# Patient Record
Sex: MALE | Race: BLACK OR AFRICAN AMERICAN | Employment: UNEMPLOYED | URBAN - METROPOLITAN AREA
[De-identification: names, ages, dates, MRNs, and addresses within clinical notes are randomized per-mention and may not be internally consistent; named-entity substitution may affect disease eponyms.]

---

## 2021-03-03 ENCOUNTER — HOSPITAL ENCOUNTER (OUTPATIENT)
Dept: LAB | Age: 46
Discharge: HOME | End: 2021-03-03
Payer: COMMERCIAL

## 2021-03-03 LAB
ALBUMIN SERPL-MCNC: 4.4 G/DL (ref 3.4–5)
ALBUMIN/GLOB SERPL: 1.3 {RATIO} (ref 0.8–1.7)
ALP SERPL-CCNC: 81 U/L (ref 45–117)
ALT SERPL-CCNC: 25 U/L (ref 16–61)
AST SERPL-CCNC: 18 U/L (ref 10–38)
BILIRUB DIRECT SERPL-MCNC: 0.1 MG/DL (ref 0–0.2)
BILIRUB SERPL-MCNC: 0.6 MG/DL (ref 0.2–1)
ERYTHROCYTE [DISTWIDTH] IN BLOOD BY AUTOMATED COUNT: 13 % (ref 11.6–14.5)
GLOBULIN SER CALC-MCNC: 3.3 G/DL (ref 2–4)
HCT VFR BLD AUTO: 48.7 % (ref 36–48)
HGB BLD-MCNC: 16.6 G/DL (ref 13–16)
MCH RBC QN AUTO: 29.4 PG (ref 24–34)
MCHC RBC AUTO-ENTMCNC: 34.1 G/DL (ref 31–37)
MCV RBC AUTO: 86.3 FL (ref 74–97)
PLATELET # BLD AUTO: 178 K/UL (ref 135–420)
PMV BLD AUTO: 10.6 FL (ref 9.2–11.8)
PROT SERPL-MCNC: 7.7 G/DL (ref 6.4–8.2)
RBC # BLD AUTO: 5.64 M/UL (ref 4.7–5.5)
WBC # BLD AUTO: 4.6 K/UL (ref 4.6–13.2)

## 2021-03-03 PROCEDURE — 36415 COLL VENOUS BLD VENIPUNCTURE: CPT

## 2021-03-03 PROCEDURE — 85027 COMPLETE CBC AUTOMATED: CPT

## 2021-03-03 PROCEDURE — 80076 HEPATIC FUNCTION PANEL: CPT

## 2023-09-19 ENCOUNTER — HOSPITAL ENCOUNTER (EMERGENCY)
Facility: HOSPITAL | Age: 48
Discharge: HOME | End: 2023-09-19
Payer: COMMERCIAL

## 2023-09-19 ENCOUNTER — APPOINTMENT (OUTPATIENT)
Facility: HOSPITAL | Age: 48
End: 2023-09-19
Payer: COMMERCIAL

## 2023-09-19 VITALS
SYSTOLIC BLOOD PRESSURE: 189 MMHG | RESPIRATION RATE: 16 BRPM | TEMPERATURE: 98.1 F | HEART RATE: 82 BPM | BODY MASS INDEX: 28.35 KG/M2 | DIASTOLIC BLOOD PRESSURE: 116 MMHG | WEIGHT: 191.4 LBS | OXYGEN SATURATION: 99 % | HEIGHT: 69 IN

## 2023-09-19 DIAGNOSIS — S13.9XXA CERVICAL SPRAIN, INITIAL ENCOUNTER: Primary | ICD-10-CM

## 2023-09-19 DIAGNOSIS — S46.911A STRAIN OF RIGHT SHOULDER, INITIAL ENCOUNTER: ICD-10-CM

## 2023-09-19 PROCEDURE — 96372 THER/PROPH/DIAG INJ SC/IM: CPT

## 2023-09-19 PROCEDURE — 73030 X-RAY EXAM OF SHOULDER: CPT

## 2023-09-19 PROCEDURE — 6370000000 HC RX 637 (ALT 250 FOR IP)

## 2023-09-19 PROCEDURE — 99284 EMERGENCY DEPT VISIT MOD MDM: CPT

## 2023-09-19 PROCEDURE — 6360000002 HC RX W HCPCS

## 2023-09-19 PROCEDURE — 72040 X-RAY EXAM NECK SPINE 2-3 VW: CPT

## 2023-09-19 RX ORDER — METHOCARBAMOL 500 MG/1
500 TABLET, FILM COATED ORAL 3 TIMES DAILY
Qty: 15 TABLET | Refills: 0 | Status: SHIPPED | OUTPATIENT
Start: 2023-09-19 | End: 2023-09-24

## 2023-09-19 RX ORDER — LIDOCAINE 4 G/G
1 PATCH TOPICAL ONCE
Status: DISCONTINUED | OUTPATIENT
Start: 2023-09-19 | End: 2023-09-19 | Stop reason: HOSPADM

## 2023-09-19 RX ORDER — NAPROXEN 500 MG/1
500 TABLET ORAL 2 TIMES DAILY
Qty: 20 TABLET | Refills: 0 | Status: SHIPPED | OUTPATIENT
Start: 2023-09-19 | End: 2023-09-29

## 2023-09-19 RX ORDER — LIDOCAINE 50 MG/G
1 PATCH TOPICAL DAILY
Qty: 10 PATCH | Refills: 0 | Status: SHIPPED | OUTPATIENT
Start: 2023-09-19 | End: 2023-09-29

## 2023-09-19 RX ORDER — KETOROLAC TROMETHAMINE 15 MG/ML
15 INJECTION, SOLUTION INTRAMUSCULAR; INTRAVENOUS ONCE
Status: DISCONTINUED | OUTPATIENT
Start: 2023-09-19 | End: 2023-09-19

## 2023-09-19 RX ORDER — AMLODIPINE BESYLATE 5 MG/1
5 TABLET ORAL DAILY
Qty: 30 TABLET | Refills: 0 | Status: SHIPPED | OUTPATIENT
Start: 2023-09-19 | End: 2023-10-19

## 2023-09-19 RX ORDER — METHOCARBAMOL 500 MG/1
500 TABLET, FILM COATED ORAL
Status: COMPLETED | OUTPATIENT
Start: 2023-09-19 | End: 2023-09-19

## 2023-09-19 RX ORDER — KETOROLAC TROMETHAMINE 15 MG/ML
15 INJECTION, SOLUTION INTRAMUSCULAR; INTRAVENOUS ONCE
Status: COMPLETED | OUTPATIENT
Start: 2023-09-19 | End: 2023-09-19

## 2023-09-19 RX ADMIN — KETOROLAC TROMETHAMINE 15 MG: 15 INJECTION, SOLUTION INTRAMUSCULAR; INTRAVENOUS at 11:10

## 2023-09-19 RX ADMIN — METHOCARBAMOL 500 MG: 500 TABLET ORAL at 11:10

## 2023-09-19 ASSESSMENT — PAIN DESCRIPTION - LOCATION: LOCATION: BACK;ARM;SHOULDER

## 2023-09-19 ASSESSMENT — LIFESTYLE VARIABLES
HOW OFTEN DO YOU HAVE A DRINK CONTAINING ALCOHOL: NEVER
HOW MANY STANDARD DRINKS CONTAINING ALCOHOL DO YOU HAVE ON A TYPICAL DAY: PATIENT DOES NOT DRINK

## 2023-09-19 ASSESSMENT — ENCOUNTER SYMPTOMS
CHEST TIGHTNESS: 0
SHORTNESS OF BREATH: 0
ABDOMINAL PAIN: 0
VOMITING: 0
COUGH: 0
DIARRHEA: 0
CONSTIPATION: 0
NAUSEA: 0

## 2023-09-19 ASSESSMENT — PAIN DESCRIPTION - FREQUENCY: FREQUENCY: CONTINUOUS

## 2023-09-19 ASSESSMENT — PAIN DESCRIPTION - ORIENTATION: ORIENTATION: RIGHT

## 2023-09-19 ASSESSMENT — PAIN SCALES - GENERAL
PAINLEVEL_OUTOF10: 8
PAINLEVEL_OUTOF10: 8

## 2023-09-19 ASSESSMENT — PAIN DESCRIPTION - PAIN TYPE: TYPE: ACUTE PAIN

## 2023-09-19 ASSESSMENT — PAIN - FUNCTIONAL ASSESSMENT: PAIN_FUNCTIONAL_ASSESSMENT: 0-10

## 2023-09-19 ASSESSMENT — PAIN DESCRIPTION - DESCRIPTORS: DESCRIPTORS: ACHING

## 2023-09-19 NOTE — ED PROVIDER NOTES
EMERGENCY DEPARTMENT HISTORY AND PHYSICAL EXAM    7:43 PM      Date: 9/19/2023  Patient Name: Sana Shah    History of Presenting Illness     Chief Complaint   Patient presents with    Shoulder Pain    Shoulder Injury    Back Pain     Arm     Arm Pain         History Provided By: the patient. Additional History (Context): Sana Shah is a 57 y.o. male with No past medical history on file. who presents with right-sided neck and shoulder pain x2 weeks. Patient reports that he works out daily and he thinks that symptoms may be due to a workout injury. Reports right-sided neck pain as well as pain going into his right shoulder and down the arm. Denies any numbness or tingling. Denies any weakness in the arm. Denies any known trauma or recent falls. States that he has been taking Tylenol and ibuprofen. Reports that the ibuprofen tends to help more than the Tylenol. States that he is still been working out. Patient reports a medical history of hypertension, but states that he has not been taking any of his medications for the last year. States that he does not have the medication or a primary care doctor. Denies any chest pain or shortness of breath. Patient states that he does have a history of a prior right-sided rotator cuff injury a few years ago that he just let heal on its own. PCP: No primary care provider on file. No current facility-administered medications for this encounter. Current Outpatient Medications   Medication Sig Dispense Refill    methocarbamol (ROBAXIN) 500 MG tablet Take 1 tablet by mouth 3 times daily for 5 days 15 tablet 0    naproxen (NAPROSYN) 500 MG tablet Take 1 tablet by mouth 2 times daily for 10 days 20 tablet 0    lidocaine (LIDODERM) 5 % Place 1 patch onto the skin daily for 10 days 12 hours on, 12 hours off.  10 patch 0    amLODIPine (NORVASC) 5 MG tablet Take 1 tablet by mouth daily 30 tablet 0       Past History     Past Medical History:  No past

## 2023-09-19 NOTE — ED TRIAGE NOTES
Patient c/o neck, back and shoulder pains on the right side for two weeks. States that he thinks that this is a workout injury from two weeks ago.

## 2023-09-19 NOTE — ED NOTES
Discharge instructions reviewed with patient. Patient verbalized understanding. Patient advised to follow up as directed on discharge instructions. Patient denies questions, needs or concerns at this time. Patient verbalized understanding. No s/sx of distress noted.        Miller Guerra RN  09/19/23 [id number]

## 2023-09-19 NOTE — DISCHARGE INSTRUCTIONS
Begin taking Robaxin up to 3 times a day as needed for muscle spasm. Begin taking Naproxen up to 2 times a day as needed for pain. Make sure you take this medication with food. Place Lidoderm patch on the affected area 12 hours on 12 hours off. Follow-up with orthopedic spine Dr. Hiwot Mac within the next 2 days in order to be reevaluated. Regarding your blood pressure, begin taking Norvasc once a day for 30 days. Please begin monitoring your blood pressure, taking it 1-2 times a day. Follow-up with [facility] medicine to establish care with a PCP. Return to the ED with any new or worsening symptoms.

## 2023-10-06 ENCOUNTER — HOSPITAL ENCOUNTER (OUTPATIENT)
Facility: HOSPITAL | Age: 48
Setting detail: RECURRING SERIES
Discharge: HOME | End: 2023-10-09
Payer: COMMERCIAL

## 2023-10-06 PROCEDURE — 97162 PT EVAL MOD COMPLEX 30 MIN: CPT

## 2023-10-06 NOTE — PROGRESS NOTES
[address] PHYSICAL THERAPY AT [place] [address] [place], [address]  Phone: [phone] Fax [phone]  Plan of Care / Statement of Necessity for Physical Therapy Services     Patient Name: Isauro Bowen : 1975   Treatment   Diagnosis: M79.601  RIGHT ARM PAIN and M25.511  RIGHT SHOULDER PAIN and M54.2  NECK PAIN Medical Diagnosis: Pain in right shoulder [M25.511]  Neck pain [M54.2]   Onset Date: 23 - referral date Payor Source: Payor: Maurizio / Plan: [place] / Product Type: *No Product type* /    Referral Source: Raphael Sainz* Start of Care [facility] 10/6/2023   Prior Hospitalization: See medical history Provider #: [id number]   Prior Level of Function: PLOF: Right handed, Previously independent with all ADLs, able to perform gym going activity without difficulty, Living Situation: Lives with wife in a 1 story home   Comorbidities: HTN     Assessment / key information:  Pt is a 57 y.o. male who presents with c/o neck and right shoulder pain. Symptoms occurred with acute onset one morning upon waking following a light day at the gym the day before. Reported functional deficits include: difficulty reaching overhead and across his body, increased pain at night - sleep disturbances, typically sleeps on right hand, increased difficulty with dressing, increased difficulty with driving, increased difficulty carrying heavier objects, increased difficulty with moderate-heavy household chores/yard work. Upon exam, pt exhibited decreased cervical ROM and right shoulder AROM, decreased cervical flexor endurance, decreased right UE strength throughout, positive provocative tests indicating cervical radiculopathy and RTC pathology, and TTP at cervical paraspinals, medial scapular border bilaterally, right clavicle, coracoid process, teres mm. . Patient scored 47 on FOTO indicating moderately decreased function
abnormalities, and instruct in home and community integration to address functional deficits and attain remaining goals.      [x] SEE POC for goals and assessment     PLAN  []  Upgrade activities as tolerated     [x]  Continue plan of care  []  Update interventions per flow sheet       []  Other:_      Isacc Garces, PT 10/6/2023  11:38 AM

## 2023-10-18 ENCOUNTER — HOSPITAL ENCOUNTER (OUTPATIENT)
Facility: HOSPITAL | Age: 48
Setting detail: RECURRING SERIES
Discharge: HOME | End: 2023-10-21
Payer: COMMERCIAL

## 2023-10-18 PROCEDURE — 97112 NEUROMUSCULAR REEDUCATION: CPT

## 2023-10-18 PROCEDURE — 97110 THERAPEUTIC EXERCISES: CPT

## 2023-10-18 PROCEDURE — 97530 THERAPEUTIC ACTIVITIES: CPT

## 2023-10-18 NOTE — PROGRESS NOTES
Total Total     [x]  Patient Education billed concurrently with other procedures   [x] Review HEP    [] Progressed/Changed HEP, detail:    [] Other detail:       Objective Information/Functional Measures/Assessment    Patient seen today to address Pain in right shoulder [M25.511], Neck pain [M54.2]. Skilled instruction provided with new ex's for correct form, and to promote proper scapular mechanics. Challenged wi th TB ER and with prone horizontal abduction due to pain along anterior aspect of shoulder. Declined modalites at end os session    Patient will continue to benefit from skilled PT / OT services to modify and progress therapeutic interventions, analyze and address functional mobility deficits, analyze and address ROM deficits, analyze and address strength deficits, analyze and address soft tissue restrictions, analyze and cue for proper movement patterns, analyze and modify for postural abnormalities, analyze and address imbalance/dizziness, and instruct in home and community integration to address functional deficits and attain remaining goals. Progress toward goals / Updated goals:  []  See Progress Note/Recertification    1- Goal: Pt to be compliant with initial HEP to improve performance of ADLs. Status at last note/certification: Established and reviewed with pt  2- Goal: Pt to improve cervical AROM by >10 degrees in all directions without pain to improve ability to drive safely. Status at last note/certification:                   AROM (deg)  Right Left   Flexion 52 p! Extension 58   Side Bend 33 p! 49   Rotation 48 58         Long Term Goals: To be accomplished in 16 treatments:  1- Goal: Pt to improve right functional IR/ER to match the left to improve ability to perform dressing/grooming ADLs.   Status at last note/certification: Functional ER: Right C7, Left T4; Functional IR: Right L5, Left T8  2- Goal: Pt to improve right shoulder flexion/abduction AROM to match the left to improve

## 2023-10-24 ENCOUNTER — HOSPITAL ENCOUNTER (OUTPATIENT)
Facility: HOSPITAL | Age: 48
Setting detail: RECURRING SERIES
Discharge: HOME | End: 2023-10-27
Payer: COMMERCIAL

## 2023-10-24 PROCEDURE — 97112 NEUROMUSCULAR REEDUCATION: CPT

## 2023-10-24 PROCEDURE — 97110 THERAPEUTIC EXERCISES: CPT

## 2023-10-24 PROCEDURE — 97530 THERAPEUTIC ACTIVITIES: CPT

## 2023-10-24 NOTE — PROGRESS NOTES
PHYSICAL / OCCUPATIONAL THERAPY - DAILY TREATMENT NOTE (updated )    Patient Name: Brendan Bowie    Date: 10/24/2023    : 1975  Insurance: Payor: Maurizio / Plan: Maurizio / Product Type: *No Product type* /      Patient  verified Yes     Visit #   Current / Total 3 16   Time   In / Out 5:24 6:02   Pain   In / Out 0 0   Subjective Functional Status/Changes: Pt reports no significant changes      TREATMENT AREA =  Pain in right shoulder [M25.511]  Neck pain [M54.2]    OBJECTIVE    Therapeutic Procedures: Tx Min Billable or 1:1 Min (if diff from Tx Min) Procedure, Rationale, Specifics   11 11 75949 Therapeutic Exercise (timed):  increase ROM, strength, coordination, balance, and proprioception to improve patient's ability to progress to PLOF and address remaining functional goals. (see flow sheet as applicable)     Details if applicable:       18 18 17100 Neuromuscular Re-Education (timed):  improve balance, coordination, kinesthetic sense, posture, core stability and proprioception to improve patient's ability to develop conscious control of individual muscles and awareness of position of extremities in order to progress to PLOF and address remaining functional goals. (see flow sheet as applicable)     Details if applicable:     9 9 03167 Therapeutic Activity (timed):  use of dynamic activities replicating functional movements to increase ROM, strength, coordination, balance, and proprioception in order to improve patient's ability to progress to PLOF and address remaining functional goals.   (see flow sheet as applicable)     Details if applicable:     45 38 [address] [facility] Reminder: bill using total billable min of TIMED therapeutic procedures (example: do not include dry needle or estim unattended, both untimed codes, in totals to left)  8-22 min = 1 unit; 23-37 min = 2 units; 38-52 min = 3 units; 53-67 min = 4 units; 68-82 min = 5 units   Total Total     [x]

## 2023-11-01 ENCOUNTER — HOSPITAL ENCOUNTER (OUTPATIENT)
Facility: HOSPITAL | Age: 48
Setting detail: RECURRING SERIES
Discharge: HOME | End: 2023-11-04
Payer: COMMERCIAL

## 2023-11-01 PROCEDURE — 97112 NEUROMUSCULAR REEDUCATION: CPT

## 2023-11-01 PROCEDURE — 97110 THERAPEUTIC EXERCISES: CPT

## 2023-11-01 NOTE — PROGRESS NOTES
PHYSICAL / OCCUPATIONAL THERAPY - DAILY TREATMENT NOTE (updated )    Patient Name: Caterina Medrano    Date: 2023    : 1975  Insurance: Payor: Maurizio / Plan: Maurizio / Product Type: *No Product type* /      Patient  verified Yes     Visit #   Current / Total 4 16   Time   In / Out 5:37 6:00   Pain   In / Out 0 0   Subjective Functional Status/Changes: I feel better now. TREATMENT AREA =  Pain in right shoulder [M25.511]  Neck pain [M54.2]    OBJECTIVE      Therapeutic Procedures: Tx Min Billable or 1:1 Min (if diff from Tx Min) Procedure, Rationale, Specifics   13  33802 Therapeutic Exercise (timed):  increase ROM, strength, coordination, balance, and proprioception to improve patient's ability to progress to PLOF and address remaining functional goals. (see flow sheet as applicable)     Details if applicable:       10  19359 Neuromuscular Re-Education (timed):  improve balance, coordination, kinesthetic sense, posture, core stability and proprioception to improve patient's ability to develop conscious control of individual muscles and awareness of position of extremities in order to progress to PLOF and address remaining functional goals.  (see flow sheet as applicable)     Details if applicable:            Details if applicable:            Details if applicable:            Details if applicable:     21  [facility] Totals Reminder: bill using total billable min of TIMED therapeutic procedures (example: do not include dry needle or estim unattended, both untimed codes, in totals to left)  8-22 min = 1 unit; 23-37 min = 2 units; 38-52 min = 3 units; 53-67 min = 4 units; 68-82 min = 5 units   Total Total     [x]  Patient Education billed concurrently with other procedures   [x] Review HEP    [] Progressed/Changed HEP, detail:    [] Other detail:       Objective Information/Functional Measures/Assessment    Patient arrived 15 minutes late for appointment,

## 2023-11-07 ENCOUNTER — APPOINTMENT (OUTPATIENT)
Facility: HOSPITAL | Age: 48
End: 2023-11-07
Payer: COMMERCIAL

## 2023-11-15 ENCOUNTER — HOSPITAL ENCOUNTER (OUTPATIENT)
Facility: HOSPITAL | Age: 48
Setting detail: RECURRING SERIES
Discharge: HOME | End: 2023-11-18
Payer: COMMERCIAL

## 2023-11-15 PROCEDURE — 97110 THERAPEUTIC EXERCISES: CPT

## 2023-11-15 PROCEDURE — 97530 THERAPEUTIC ACTIVITIES: CPT

## 2023-11-20 ENCOUNTER — APPOINTMENT (OUTPATIENT)
Facility: HOSPITAL | Age: 48
End: 2023-11-20
Payer: COMMERCIAL

## 2023-11-29 ENCOUNTER — APPOINTMENT (OUTPATIENT)
Facility: HOSPITAL | Age: 48
End: 2023-11-29
Payer: COMMERCIAL

## 2024-12-21 ENCOUNTER — APPOINTMENT (OUTPATIENT)
Facility: HOSPITAL | Age: 49
End: 2024-12-21
Payer: COMMERCIAL

## 2024-12-21 ENCOUNTER — HOSPITAL ENCOUNTER (EMERGENCY)
Facility: HOSPITAL | Age: 49
Discharge: HOME | End: 2024-12-21
Attending: EMERGENCY MEDICINE
Payer: COMMERCIAL

## 2024-12-21 VITALS
OXYGEN SATURATION: 94 % | TEMPERATURE: 99.1 F | DIASTOLIC BLOOD PRESSURE: 91 MMHG | SYSTOLIC BLOOD PRESSURE: 148 MMHG | HEIGHT: 69 IN | HEART RATE: 68 BPM | RESPIRATION RATE: 16 BRPM | WEIGHT: 185.2 LBS | BODY MASS INDEX: 27.43 KG/M2

## 2024-12-21 DIAGNOSIS — J11.1 FLU: ICD-10-CM

## 2024-12-21 DIAGNOSIS — K85.00 IDIOPATHIC ACUTE PANCREATITIS WITHOUT INFECTION OR NECROSIS: ICD-10-CM

## 2024-12-21 DIAGNOSIS — J18.9 PNEUMONIA OF BOTH LOWER LOBES DUE TO INFECTIOUS ORGANISM: Primary | ICD-10-CM

## 2024-12-21 LAB
ALBUMIN SERPL-MCNC: 3.6 G/DL (ref 3.4–5)
ALBUMIN/GLOB SERPL: 0.9 (ref 0.8–1.7)
ALP SERPL-CCNC: 71 U/L (ref 45–117)
ALT SERPL-CCNC: 28 U/L (ref 16–61)
ANION GAP SERPL CALC-SCNC: 9 MMOL/L (ref 3–18)
AST SERPL-CCNC: 41 U/L (ref 10–38)
BASOPHILS # BLD: 0 K/UL (ref 0–0.1)
BASOPHILS NFR BLD: 0 % (ref 0–2)
BILIRUB SERPL-MCNC: 0.5 MG/DL (ref 0.2–1)
BUN SERPL-MCNC: 14 MG/DL (ref 7–18)
BUN/CREAT SERPL: 10 (ref 12–20)
CALCIUM SERPL-MCNC: 8.7 MG/DL (ref 8.5–10.1)
CHLORIDE SERPL-SCNC: 98 MMOL/L (ref 100–111)
CO2 SERPL-SCNC: 29 MMOL/L (ref 21–32)
CREAT SERPL-MCNC: 1.38 MG/DL (ref 0.6–1.3)
DIFFERENTIAL METHOD BLD: ABNORMAL
EKG ATRIAL RATE: 87 BPM
EKG DIAGNOSIS: NORMAL
EKG P AXIS: 51 DEGREES
EKG P-R INTERVAL: 156 MS
EKG Q-T INTERVAL: 350 MS
EKG QRS DURATION: 82 MS
EKG QTC CALCULATION (BAZETT): 421 MS
EKG R AXIS: -16 DEGREES
EKG T AXIS: 35 DEGREES
EKG VENTRICULAR RATE: 87 BPM
EOSINOPHIL # BLD: 0 K/UL (ref 0–0.4)
EOSINOPHIL NFR BLD: 0 % (ref 0–5)
ERYTHROCYTE [DISTWIDTH] IN BLOOD BY AUTOMATED COUNT: 13 % (ref 11.6–14.5)
GLOBULIN SER CALC-MCNC: 3.8 G/DL (ref 2–4)
GLUCOSE SERPL-MCNC: 103 MG/DL (ref 74–99)
HCT VFR BLD AUTO: 51.2 % (ref 36–48)
HGB BLD-MCNC: 17.5 G/DL (ref 13–16)
IMM GRANULOCYTES # BLD AUTO: 0 K/UL (ref 0–0.04)
IMM GRANULOCYTES NFR BLD AUTO: 0 % (ref 0–0.5)
LIPASE SERPL-CCNC: 199 U/L (ref 13–75)
LYMPHOCYTES # BLD: 0.8 K/UL (ref 0.9–3.6)
LYMPHOCYTES NFR BLD: 40 % (ref 21–52)
MCH RBC QN AUTO: 29 PG (ref 24–34)
MCHC RBC AUTO-ENTMCNC: 34.2 G/DL (ref 31–37)
MCV RBC AUTO: 84.9 FL (ref 78–100)
MONOCYTES # BLD: 0.3 K/UL (ref 0.05–1.2)
MONOCYTES NFR BLD: 12 % (ref 3–10)
NEUTS SEG # BLD: 1 K/UL (ref 1.8–8)
NEUTS SEG NFR BLD: 48 % (ref 40–73)
NRBC # BLD: 0 K/UL (ref 0–0.01)
NRBC BLD-RTO: 0 PER 100 WBC
PLATELET # BLD AUTO: 124 K/UL (ref 135–420)
PMV BLD AUTO: 9.7 FL (ref 9.2–11.8)
POTASSIUM SERPL-SCNC: 3.6 MMOL/L (ref 3.5–5.5)
PROT SERPL-MCNC: 7.4 G/DL (ref 6.4–8.2)
RBC # BLD AUTO: 6.03 M/UL (ref 4.35–5.65)
SODIUM SERPL-SCNC: 136 MMOL/L (ref 136–145)
TROPONIN I SERPL HS-MCNC: 12 NG/L (ref 0–78)
TROPONIN I SERPL HS-MCNC: 13 NG/L (ref 0–78)
WBC # BLD AUTO: 2 K/UL (ref 4.6–13.2)

## 2024-12-21 PROCEDURE — 2580000003 HC RX 258: Performed by: EMERGENCY MEDICINE

## 2024-12-21 PROCEDURE — 99285 EMERGENCY DEPT VISIT HI MDM: CPT

## 2024-12-21 PROCEDURE — 84484 ASSAY OF TROPONIN QUANT: CPT

## 2024-12-21 PROCEDURE — 93005 ELECTROCARDIOGRAM TRACING: CPT | Performed by: EMERGENCY MEDICINE

## 2024-12-21 PROCEDURE — 6360000002 HC RX W HCPCS: Performed by: EMERGENCY MEDICINE

## 2024-12-21 PROCEDURE — 96376 TX/PRO/DX INJ SAME DRUG ADON: CPT

## 2024-12-21 PROCEDURE — 96374 THER/PROPH/DIAG INJ IV PUSH: CPT

## 2024-12-21 PROCEDURE — 85025 COMPLETE CBC W/AUTO DIFF WBC: CPT

## 2024-12-21 PROCEDURE — 80053 COMPREHEN METABOLIC PANEL: CPT

## 2024-12-21 PROCEDURE — 96372 THER/PROPH/DIAG INJ SC/IM: CPT

## 2024-12-21 PROCEDURE — 6370000000 HC RX 637 (ALT 250 FOR IP): Performed by: EMERGENCY MEDICINE

## 2024-12-21 PROCEDURE — 93010 ELECTROCARDIOGRAM REPORT: CPT | Performed by: INTERNAL MEDICINE

## 2024-12-21 PROCEDURE — 96375 TX/PRO/DX INJ NEW DRUG ADDON: CPT

## 2024-12-21 PROCEDURE — 71045 X-RAY EXAM CHEST 1 VIEW: CPT

## 2024-12-21 PROCEDURE — 96361 HYDRATE IV INFUSION ADD-ON: CPT

## 2024-12-21 PROCEDURE — 83690 ASSAY OF LIPASE: CPT

## 2024-12-21 RX ORDER — LABETALOL HYDROCHLORIDE 5 MG/ML
5 INJECTION, SOLUTION INTRAVENOUS
Status: COMPLETED | OUTPATIENT
Start: 2024-12-21 | End: 2024-12-21

## 2024-12-21 RX ORDER — DOXYCYCLINE 100 MG/1
100 CAPSULE ORAL 2 TIMES DAILY
COMMUNITY
Start: 2024-12-20 | End: 2024-12-27

## 2024-12-21 RX ORDER — AMLODIPINE BESYLATE 2.5 MG/1
2.5 TABLET ORAL DAILY
COMMUNITY
Start: 2024-12-20

## 2024-12-21 RX ORDER — 0.9 % SODIUM CHLORIDE 0.9 %
1000 INTRAVENOUS SOLUTION INTRAVENOUS ONCE
Status: COMPLETED | OUTPATIENT
Start: 2024-12-21 | End: 2024-12-21

## 2024-12-21 RX ORDER — IBUPROFEN 600 MG/1
600 TABLET, FILM COATED ORAL EVERY 6 HOURS PRN
COMMUNITY
Start: 2024-12-20 | End: 2024-12-30

## 2024-12-21 RX ORDER — DICYCLOMINE HYDROCHLORIDE 10 MG/ML
20 INJECTION INTRAMUSCULAR
Status: COMPLETED | OUTPATIENT
Start: 2024-12-21 | End: 2024-12-21

## 2024-12-21 RX ORDER — LABETALOL HYDROCHLORIDE 5 MG/ML
10 INJECTION, SOLUTION INTRAVENOUS
Status: DISCONTINUED | OUTPATIENT
Start: 2024-12-21 | End: 2024-12-22 | Stop reason: HOSPADM

## 2024-12-21 RX ORDER — ONDANSETRON 4 MG/1
4 TABLET, ORALLY DISINTEGRATING ORAL EVERY 8 HOURS PRN
COMMUNITY
Start: 2024-12-20 | End: 2024-12-25

## 2024-12-21 RX ORDER — DICYCLOMINE HCL 20 MG
20 TABLET ORAL 4 TIMES DAILY PRN
Qty: 30 TABLET | Refills: 0 | Status: SHIPPED | OUTPATIENT
Start: 2024-12-21

## 2024-12-21 RX ORDER — ACETAMINOPHEN 500 MG
1000 TABLET ORAL
Status: COMPLETED | OUTPATIENT
Start: 2024-12-21 | End: 2024-12-21

## 2024-12-21 RX ORDER — HYDROCODONE POLISTIREX AND CHLORPHENIRAMINE POLISTIREX 10; 8 MG/5ML; MG/5ML
2.5 SUSPENSION, EXTENDED RELEASE ORAL EVERY 12 HOURS PRN
COMMUNITY
Start: 2024-12-20 | End: 2024-12-26

## 2024-12-21 RX ORDER — ONDANSETRON 2 MG/ML
4 INJECTION INTRAMUSCULAR; INTRAVENOUS
Status: COMPLETED | OUTPATIENT
Start: 2024-12-21 | End: 2024-12-21

## 2024-12-21 RX ORDER — OSELTAMIVIR PHOSPHATE 75 MG/1
75 CAPSULE ORAL 2 TIMES DAILY
Qty: 10 CAPSULE | Refills: 0 | Status: SHIPPED | OUTPATIENT
Start: 2024-12-21 | End: 2024-12-26

## 2024-12-21 RX ADMIN — ACETAMINOPHEN 1000 MG: 500 TABLET ORAL at 21:42

## 2024-12-21 RX ADMIN — LABETALOL HYDROCHLORIDE 5 MG: 5 INJECTION, SOLUTION INTRAVENOUS at 21:41

## 2024-12-21 RX ADMIN — LABETALOL HYDROCHLORIDE 5 MG: 5 INJECTION, SOLUTION INTRAVENOUS at 20:46

## 2024-12-21 RX ADMIN — ONDANSETRON 4 MG: 2 INJECTION, SOLUTION INTRAMUSCULAR; INTRAVENOUS at 21:42

## 2024-12-21 RX ADMIN — SODIUM CHLORIDE 1000 ML: 9 INJECTION, SOLUTION INTRAVENOUS at 21:40

## 2024-12-21 RX ADMIN — DICYCLOMINE HYDROCHLORIDE 20 MG: 10 INJECTION, SOLUTION INTRAMUSCULAR at 22:42

## 2024-12-21 ASSESSMENT — PAIN DESCRIPTION - FREQUENCY
FREQUENCY: CONTINUOUS
FREQUENCY: CONTINUOUS

## 2024-12-21 ASSESSMENT — PAIN DESCRIPTION - PAIN TYPE
TYPE: ACUTE PAIN
TYPE: ACUTE PAIN

## 2024-12-21 ASSESSMENT — PAIN - FUNCTIONAL ASSESSMENT
PAIN_FUNCTIONAL_ASSESSMENT: ACTIVITIES ARE NOT PREVENTED
PAIN_FUNCTIONAL_ASSESSMENT: 0-10

## 2024-12-21 ASSESSMENT — PAIN DESCRIPTION - DESCRIPTORS
DESCRIPTORS: ACHING
DESCRIPTORS: ACHING

## 2024-12-21 ASSESSMENT — ENCOUNTER SYMPTOMS
RESPIRATORY NEGATIVE: 1
GASTROINTESTINAL NEGATIVE: 1

## 2024-12-21 ASSESSMENT — PAIN SCALES - GENERAL
PAINLEVEL_OUTOF10: 9
PAINLEVEL_OUTOF10: 7

## 2024-12-21 ASSESSMENT — PAIN DESCRIPTION - LOCATION
LOCATION: CHEST
LOCATION: HEAD

## 2024-12-21 ASSESSMENT — LIFESTYLE VARIABLES
HOW MANY STANDARD DRINKS CONTAINING ALCOHOL DO YOU HAVE ON A TYPICAL DAY: PATIENT DOES NOT DRINK
HOW OFTEN DO YOU HAVE A DRINK CONTAINING ALCOHOL: NEVER

## 2024-12-21 ASSESSMENT — PAIN DESCRIPTION - ORIENTATION: ORIENTATION: MID

## 2024-12-21 ASSESSMENT — PAIN DESCRIPTION - ONSET
ONSET: AWAKENED FROM SLEEP
ONSET: AWAKENED FROM SLEEP

## 2024-12-22 NOTE — ED TRIAGE NOTES
Pt c/o chest pain, SOB, and hematemesis since 4 days.    Pt stated \"that he has walking pneumonia diagnosis by [facility] yesterday and has gotten worse\". Pt also stated \"that he has middle chest pain\".    History reviewed. No pertinent past medical history.    Pt ambulated to the room with a steady gait.

## 2024-12-22 NOTE — ED PROVIDER NOTES
[facility] EMERGENCY DEPT  eMERGENCY dEPARTMENT eNCOUnter      Pt Name: Aj Tirado  MRN: [id number]  Birthdate 1975 of evaluation: 12/21/2024  Provider:Germán Contreras MD    CHIEF COMPLAINT       HPI    Aj Tirado is a 49 y.o. male.  Was diagnosed with pneumonia yesterday at [facility] yesterday.  Patient states he started vomiting today with minium blood in the vomitus accompied with having  shortness of breath vomiting and chest pain.with vomiting.    ROS    Review of Systems   Constitutional:  Positive for appetite change.   HENT:  Negative for congestion.    Respiratory: Negative.     Cardiovascular: Negative.    Gastrointestinal: Negative.    Genitourinary: Negative.    Musculoskeletal: Negative.      Except as noted above the remainder of the review of systems was reviewed and negative.       PAST MEDICAL HISTORY   History reviewed. No pertinent past medical history.      SURGICAL HISTORY     History reviewed. No pertinent surgical history.      CURRENTMEDICATIONS       Previous Medications    AMLODIPINE (NORVASC) 2.5 MG TABLET    Take 1 tablet by mouth daily    AMLODIPINE (NORVASC) 5 MG TABLET    Take 1 tablet by mouth daily    DOXYCYCLINE HYCLATE (VIBRAMYCIN) 100 MG CAPSULE    Take 1 capsule by mouth 2 times daily    HYDROCODONE-CHLORPHENIRAMINE (TUSSIONEX) 10-8 MG/5ML SUER    Take 2.5 mLs by mouth every 12 hours as needed.    IBUPROFEN (ADVIL;MOTRIN) 600 MG TABLET    Take 1 tablet by mouth every 6 hours as needed    NAPROXEN (NAPROSYN) 500 MG TABLET    Take 1 tablet by mouth 2 times daily for 10 days    ONDANSETRON (ZOFRAN-ODT) 4 MG DISINTEGRATING TABLET    Take 1 tablet by mouth every 8 hours as needed       ALLERGIES     Patient has no known allergies.    FAMILY HISTORY     History reviewed. No pertinent family history.       SOCIAL HISTORY       Social History     Socioeconomic History    Marital status:      Spouse name: None    Number of children: None    Years of education: None

## 2024-12-22 NOTE — ED NOTES
Administered medication and educated patient on side effects. Patient allergies verified. All questions and concerns answered. Pt instructed to use call bell at the bedside if needed. This nurse will continues to monitor pt at this time. Side rails up.     Patient resting on stretcher, NAD noted, call bell in reach, bed low and locked with side rails up x 2; Reports pain 0/10. Updated on plan of care, patient verbalizes understanding. This nurse will continue to monitor.